# Patient Record
Sex: MALE | Race: WHITE | Employment: FULL TIME | URBAN - METROPOLITAN AREA
[De-identification: names, ages, dates, MRNs, and addresses within clinical notes are randomized per-mention and may not be internally consistent; named-entity substitution may affect disease eponyms.]

---

## 2022-12-16 ENCOUNTER — APPOINTMENT (OUTPATIENT)
Dept: URGENT CARE | Facility: CLINIC | Age: 42
End: 2022-12-16
Payer: MEDICAID

## 2025-05-04 ENCOUNTER — OFFICE VISIT (OUTPATIENT)
Dept: URGENT CARE | Facility: CLINIC | Age: 45
End: 2025-05-04
Payer: MEDICAID

## 2025-05-04 VITALS
RESPIRATION RATE: 18 BRPM | SYSTOLIC BLOOD PRESSURE: 132 MMHG | TEMPERATURE: 98.8 F | BODY MASS INDEX: 31.13 KG/M2 | HEIGHT: 69 IN | OXYGEN SATURATION: 96 % | DIASTOLIC BLOOD PRESSURE: 88 MMHG | WEIGHT: 210.2 LBS | HEART RATE: 110 BPM

## 2025-05-04 DIAGNOSIS — L84 CORN OF FOOT: ICD-10-CM

## 2025-05-04 DIAGNOSIS — J45.901 MODERATE ASTHMA WITH EXACERBATION, UNSPECIFIED WHETHER PERSISTENT: ICD-10-CM

## 2025-05-04 PROCEDURE — 3075F SYST BP GE 130 - 139MM HG: CPT | Performed by: PHYSICIAN ASSISTANT

## 2025-05-04 PROCEDURE — 3079F DIAST BP 80-89 MM HG: CPT | Performed by: PHYSICIAN ASSISTANT

## 2025-05-04 PROCEDURE — 99214 OFFICE O/P EST MOD 30 MIN: CPT | Performed by: PHYSICIAN ASSISTANT

## 2025-05-04 RX ORDER — BENZONATATE 100 MG/1
100 CAPSULE ORAL 3 TIMES DAILY PRN
Qty: 20 CAPSULE | Refills: 0 | Status: SHIPPED | OUTPATIENT
Start: 2025-05-04

## 2025-05-04 RX ORDER — PREDNISONE 20 MG/1
TABLET ORAL
Qty: 10 TABLET | Refills: 0 | Status: SHIPPED | OUTPATIENT
Start: 2025-05-04

## 2025-05-04 NOTE — PATIENT INSTRUCTIONS
"Salicylic acid plaster - Salicylic acid plaster 40% is available without a prescription and is used as follows:  Debulk the callus or corn by paring skin with a no. 15 scalpel blade.  Cut the plaster to the size of the lesion and apply. Tape can be used to keep the patch in place and avoid it slipping onto unaffected areas.  Leave in place for 48 hours.  Pare down the remaining skin; replace the plaster patch and let the patient resume proper foot care and continue applying the patch at home.  Patients should be advised to remove the white, \"dead\" skin with a metal nail file or pumice stone each night before replacing the patch. Soaking the skin in warm water for five minutes before using a nail file or pumice stone can help in removing the \"dead\" skin.  Use of the patch should stop once the lesion has resolved.  Follow-up if lesions do not resolve within one to two weeks.  Do not use plaster in patients with peripheral neuropathies. These patients may not notice pain with improper patch placement and can develop damage to normal skin.    "

## 2025-05-04 NOTE — PROGRESS NOTES
Subjective:     Verbal consent was acquired by the patient to use [email or web address] ambient listening note generation during this visit     Juan M Jeffers is a 44 y.o. male who presents for Cough (2 days ) and Congestion (3 days)       History of Present Illness  The patient presents for evaluation of cough, asthma exacerbation and a painful corn on his big toe.    He experienced cold symptoms a few days prior, which has since progressed to his chest. This is a recurrent pattern he has observed over the years. The initial symptoms included a runny nose, which began 2 days ago, followed by a cough and shortness of breath. He reports no fevers or body aches. The cough is productive, yielding clear white sputum. No treatment has been sought for the cough. Wheezing and increased use of his inhaler are reported. There are no ear-related issues or thick yellow-green nasal discharge.  He does not have a primary care physician.     A history of pneumonia is noted, typically necessitating hospitalization, although it has been several years since the last episode. Asthma is also diagnosed, for which Advair and albuterol are used. Steroids have been required for severe asthma exacerbations, with prednisone proving effective.    Severe pain in the big toe has been ongoing for approximately 2 months. The sensation is described as akin to a hot poker. 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  The pain is in the plantar surface of the left foot and he has noted thickening in that area.          Medications:  albuterol Nebu  azithromycin Tabs  fluticasone-salmeterol Aepb  predniSONE Tabs  tiotropium Caps    Allergies:             Patient has no known allergies.    Past Social Hx:  Juan M Jeffers  reports that he has never smoked. He uses smokeless tobacco. He reports current alcohol use. He reports that he does not use drugs.           Problem list, medications, and allergies reviewed by myself today  "in Epic.     Objective:     /88   Pulse (!) 110   Temp 37.1 °C (98.8 °F) (Temporal)   Resp 18   Ht 1.753 m (5' 9\")   Wt 95.3 kg (210 lb 3.2 oz)   SpO2 96%   BMI 31.04 kg/m²     Physical Exam  Vitals and nursing note reviewed.   Constitutional:       General: He is not in acute distress.     Appearance: Normal appearance. He is well-developed. He is not ill-appearing, toxic-appearing or diaphoretic.      Comments: This is a nontoxic-appearing child in no apparent distress   HENT:      Head: Normocephalic.      Right Ear: Tympanic membrane normal.      Left Ear: Tympanic membrane normal.      Nose: Congestion and rhinorrhea present.      Mouth/Throat:      Pharynx: Posterior oropharyngeal erythema present.   Eyes:      General:         Right eye: No discharge.         Left eye: No discharge.      Extraocular Movements: Extraocular movements intact.      Conjunctiva/sclera: Conjunctivae normal.      Pupils: Pupils are equal, round, and reactive to light.   Cardiovascular:      Rate and Rhythm: Normal rate and regular rhythm.      Pulses: Normal pulses.      Heart sounds: Normal heart sounds.   Pulmonary:      Effort: Pulmonary effort is normal. Prolonged expiration present. No tachypnea, accessory muscle usage or respiratory distress.      Breath sounds: Wheezing present. No decreased breath sounds, rhonchi or rales.      Comments: Scattered expiratory wheezing thoughout.  No rhonchi or rales.  No work of breathing identified.  Speaking in full sentences.  No spasmodic cough.  Musculoskeletal:         General: Normal range of motion.      Cervical back: Normal range of motion and neck supple. No rigidity.   Lymphadenopathy:      Cervical: No cervical adenopathy.   Skin:     General: Skin is warm and dry.   Neurological:      Mental Status: He is alert and oriented to person, place, and time.   Psychiatric:         Behavior: Behavior is cooperative.         Physical Exam  Mouth/Throat: Mucous membranes " moist, no erythema, no exudate  Respiratory: Wheezing noted during exhalation  Extremities: Corn noted on the big toe      Hyperkeratosis noted to the plantar surface of the great toe.  Mildly tender to palpation.  No surrounding erythema or evidence of cellulitis/infection.  Central core is noted.    Assessment/Plan:     Diagnosis and Associated Orders:     1. Moderate asthma with exacerbation, unspecified whether persistent  - predniSONE (DELTASONE) 20 MG Tab; Tab 2 po qd x 5 days  Dispense: 10 Tablet; Refill: 0  - benzonatate (TESSALON) 100 MG Cap; Take 1 Capsule by mouth 3 times a day as needed for Cough.  Dispense: 20 Capsule; Refill: 0    2. Corn of foot  - Salicylic Acid 40 % Pads; Apply directly over affected area; leave in place for 48 hours. May repeat procedure every 48 hours as needed for up to 14 days.  Dispense: 20 Each; Refill: 1        Medical Decision Making:    Merissa 44 y.o. presents to clinic with:    Assessment & Plan  Asthma exacerbation, chronic problem with acute exacerbation  - Symptoms suggest a viral infection exacerbating asthma, with a cough and wheezing noted.  Vital signs stable and reassuring.  He has been afebrile.  Low suspicion for bacterial process based on short duration of symptoms and lack of constitutional symptoms.  - Physical exam reveals wheezing on expiration, pulse oximetry at 96%.  - Advised to use albuterol every 4 to 6 hours and continue using the inhaler regularly. Prednisone 40 mg [facility]. Avoid Advil, Aleve, or ibuprofen while on prednisone; Tylenol may be used for pain or headache.   - If fever, worsening shortness of breath, or increased coughing with discolored sputum develop, an antibiotic will be considered. A chest x-ray will be ordered if fever develops.    Hyperkeratosis/corn  - Likely due to pressure from tight shoes or abnormal gait, does not resemble a plantar wart.  - Advised to avoid ill-fitting shoes and consider orthotics.  -  Salicylic acid pads recommended for application directly over the corn, to be left in place for 48 hours and repeated every 48 hours for up to 2 days. Remove white dead skin with a metal nail file or pumice stone each night before replacing the patch, soaking the skin in warm water for 5 minutes prior to using the nail file or pumice stone to aid in dead skin removal.  - Patch should be discontinued once the corn resolves.    I personally reviewed prior external notes and test results pertinent to today's visit. Patient is clinically stable at today's urgent care visit.  Patient appears nontoxic with no acute distress noted. Appropriate for outpatient care at this time.  Return to clinic or go to ED if symptoms worsen or persist.  Red flag symptoms discussed.  Patient/Parent/Guardian voices understanding.   All side effects of medication discussed including allergic response, GI upset, tendon injury, rash, sedation etc    Please note that this dictation was created using voice recognition software. I have made a reasonable attempt to correct obvious errors, but I expect that there are errors of grammar and possibly content that I did not discover before finalizing the note.    This note was electronically signed by Erika Todd PA-C